# Patient Record
Sex: FEMALE | Race: WHITE | Employment: UNEMPLOYED | URBAN - METROPOLITAN AREA
[De-identification: names, ages, dates, MRNs, and addresses within clinical notes are randomized per-mention and may not be internally consistent; named-entity substitution may affect disease eponyms.]

---

## 2020-10-25 ENCOUNTER — HOSPITAL ENCOUNTER (EMERGENCY)
Age: 55
Discharge: HOME | End: 2020-10-25
Attending: EMERGENCY MEDICINE
Payer: COMMERCIAL

## 2020-10-25 ENCOUNTER — APPOINTMENT (OUTPATIENT)
Dept: GENERAL RADIOLOGY | Age: 55
End: 2020-10-25
Payer: COMMERCIAL

## 2020-10-25 VITALS
TEMPERATURE: 98.2 F | WEIGHT: 200 LBS | RESPIRATION RATE: 16 BRPM | SYSTOLIC BLOOD PRESSURE: 113 MMHG | HEART RATE: 93 BPM | DIASTOLIC BLOOD PRESSURE: 68 MMHG | OXYGEN SATURATION: 95 %

## 2020-10-25 LAB
A/G RATIO: 1.8 (ref 1.1–2.2)
ALBUMIN SERPL-MCNC: 4.2 G/DL (ref 3.4–5)
ALP BLD-CCNC: 73 U/L (ref 40–129)
ALT SERPL-CCNC: 16 U/L (ref 10–40)
ANION GAP SERPL CALCULATED.3IONS-SCNC: 10 MMOL/L (ref 3–16)
AST SERPL-CCNC: 21 U/L (ref 15–37)
BASOPHILS ABSOLUTE: 0.1 K/UL (ref 0–0.2)
BASOPHILS RELATIVE PERCENT: 0.7 %
BILIRUB SERPL-MCNC: 0.5 MG/DL (ref 0–1)
BUN BLDV-MCNC: 14 MG/DL (ref 7–20)
CALCIUM SERPL-MCNC: 8.7 MG/DL (ref 8.3–10.6)
CHLORIDE BLD-SCNC: 103 MMOL/L (ref 99–110)
CO2: 25 MMOL/L (ref 21–32)
CREAT SERPL-MCNC: 0.7 MG/DL (ref 0.6–1.1)
EOSINOPHILS ABSOLUTE: 0.1 K/UL (ref 0–0.6)
EOSINOPHILS RELATIVE PERCENT: 1 %
GFR AFRICAN AMERICAN: >60
GFR NON-AFRICAN AMERICAN: >60
GLOBULIN: 2.4 G/DL
GLUCOSE BLD-MCNC: 102 MG/DL (ref 70–99)
HCG QUALITATIVE: NEGATIVE
HCT VFR BLD CALC: 41.2 % (ref 36–48)
HEMOGLOBIN: 13.8 G/DL (ref 12–16)
LYMPHOCYTES ABSOLUTE: 2.8 K/UL (ref 1–5.1)
LYMPHOCYTES RELATIVE PERCENT: 27.5 %
MCH RBC QN AUTO: 30.5 PG (ref 26–34)
MCHC RBC AUTO-ENTMCNC: 33.5 G/DL (ref 31–36)
MCV RBC AUTO: 90.9 FL (ref 80–100)
MONOCYTES ABSOLUTE: 0.5 K/UL (ref 0–1.3)
MONOCYTES RELATIVE PERCENT: 5.2 %
NEUTROPHILS ABSOLUTE: 6.7 K/UL (ref 1.7–7.7)
NEUTROPHILS RELATIVE PERCENT: 65.6 %
PDW BLD-RTO: 14 % (ref 12.4–15.4)
PLATELET # BLD: 272 K/UL (ref 135–450)
PMV BLD AUTO: 7.6 FL (ref 5–10.5)
POTASSIUM SERPL-SCNC: 4.2 MMOL/L (ref 3.5–5.1)
RBC # BLD: 4.53 M/UL (ref 4–5.2)
REASON FOR REJECTION: NORMAL
REJECTED TEST: NORMAL
SEDIMENTATION RATE, ERYTHROCYTE: 9 MM/HR (ref 0–30)
SODIUM BLD-SCNC: 138 MMOL/L (ref 136–145)
TOTAL PROTEIN: 6.6 G/DL (ref 6.4–8.2)
TROPONIN: <0.01 NG/ML
TROPONIN: <0.01 NG/ML
WBC # BLD: 10.3 K/UL (ref 4–11)

## 2020-10-25 PROCEDURE — 2500000003 HC RX 250 WO HCPCS: Performed by: EMERGENCY MEDICINE

## 2020-10-25 PROCEDURE — 80053 COMPREHEN METABOLIC PANEL: CPT

## 2020-10-25 PROCEDURE — 99284 EMERGENCY DEPT VISIT MOD MDM: CPT

## 2020-10-25 PROCEDURE — 96374 THER/PROPH/DIAG INJ IV PUSH: CPT

## 2020-10-25 PROCEDURE — 96375 TX/PRO/DX INJ NEW DRUG ADDON: CPT

## 2020-10-25 PROCEDURE — 85025 COMPLETE CBC W/AUTO DIFF WBC: CPT

## 2020-10-25 PROCEDURE — 71046 X-RAY EXAM CHEST 2 VIEWS: CPT

## 2020-10-25 PROCEDURE — 84484 ASSAY OF TROPONIN QUANT: CPT

## 2020-10-25 PROCEDURE — 84703 CHORIONIC GONADOTROPIN ASSAY: CPT

## 2020-10-25 PROCEDURE — 85652 RBC SED RATE AUTOMATED: CPT

## 2020-10-25 PROCEDURE — 36415 COLL VENOUS BLD VENIPUNCTURE: CPT

## 2020-10-25 PROCEDURE — 6360000002 HC RX W HCPCS: Performed by: EMERGENCY MEDICINE

## 2020-10-25 RX ORDER — DEXAMETHASONE SODIUM PHOSPHATE 4 MG/ML
8 INJECTION, SOLUTION INTRA-ARTICULAR; INTRALESIONAL; INTRAMUSCULAR; INTRAVENOUS; SOFT TISSUE ONCE
Status: COMPLETED | OUTPATIENT
Start: 2020-10-25 | End: 2020-10-25

## 2020-10-25 RX ORDER — PREDNISONE 10 MG/1
40 TABLET ORAL DAILY
Qty: 12 TABLET | Refills: 0 | Status: SHIPPED | OUTPATIENT
Start: 2020-10-25 | End: 2020-10-25 | Stop reason: SDUPTHER

## 2020-10-25 RX ORDER — PREDNISONE 10 MG/1
40 TABLET ORAL DAILY
Qty: 12 TABLET | Refills: 0 | Status: SHIPPED | OUTPATIENT
Start: 2020-10-25 | End: 2020-10-28

## 2020-10-25 RX ORDER — DIPHENHYDRAMINE HYDROCHLORIDE 50 MG/ML
12.5 INJECTION INTRAMUSCULAR; INTRAVENOUS ONCE
Status: COMPLETED | OUTPATIENT
Start: 2020-10-25 | End: 2020-10-25

## 2020-10-25 RX ORDER — DEXAMETHASONE SODIUM PHOSPHATE 10 MG/ML
8 INJECTION INTRAMUSCULAR; INTRAVENOUS ONCE
Status: DISCONTINUED | OUTPATIENT
Start: 2020-10-25 | End: 2020-10-25 | Stop reason: CLARIF

## 2020-10-25 RX ADMIN — DEXAMETHASONE SODIUM PHOSPHATE 8 MG: 4 INJECTION, SOLUTION INTRA-ARTICULAR; INTRALESIONAL; INTRAMUSCULAR; INTRAVENOUS; SOFT TISSUE at 03:01

## 2020-10-25 RX ADMIN — FAMOTIDINE 20 MG: 10 INJECTION, SOLUTION INTRAVENOUS at 03:00

## 2020-10-25 RX ADMIN — DIPHENHYDRAMINE HYDROCHLORIDE 12.5 MG: 50 INJECTION, SOLUTION INTRAMUSCULAR; INTRAVENOUS at 03:00

## 2020-10-25 ASSESSMENT — ENCOUNTER SYMPTOMS
TROUBLE SWALLOWING: 0
DIARRHEA: 0
SORE THROAT: 0
SHORTNESS OF BREATH: 1
COUGH: 0
COLOR CHANGE: 1
NAUSEA: 1
FACIAL SWELLING: 0
VOMITING: 0
EYE DISCHARGE: 0
BACK PAIN: 0
EYE REDNESS: 0
CHEST TIGHTNESS: 1
PHOTOPHOBIA: 0
ABDOMINAL PAIN: 1
EYE PAIN: 0
WHEEZING: 0

## 2020-10-25 NOTE — ED PROVIDER NOTES
[address] [facility]  ED  EMERGENCY DEPARTMENT ENCOUNTER        Pt Name: Pankaj Negrete  MRN: [id number]  Armstrongfcherelle 1965  Date of evaluation: 10/25/2020  Provider: Char Plata MD  PCP: Jacinto Ibarra       Chief Complaint   Patient presents with    Allergic Reaction     woke up feeling SOB with generalized redness, took oral benadryl prior to EMS arrival - unsure of what reaction was to       HISTORY OFPRESENT ILLNESS   (Location/Symptom, Timing/Onset, Context/Setting, Quality, Duration, Modifying Factors,Severity)  Note limiting factors. Pankaj Negrete is a 63 y.o. female presenting today due to concern for waking up around 1:15 AM this morning which was roughly 1 hour prior to arrival and feeling like her skin was itching and burning on the arms and legs but also feeling very lightheaded and short of breath with some chest heaviness. She also felt nauseated and is having some stomach cramping although denies any diarrhea. She denies any fever or cough. No exposure to Covid. She has had allergic reactions before to antibiotics and this felt similar. She did take 1 Benadryl at home at 1:20 AM and this did help. She also became nauseous on the ride over so EMS gave her Zofran. She states that yesterday she was working in the yard but was covered from neck to toe and only her face and hands were exposed. She denies any hives. She denies any facial swelling or tongue or throat swelling. No syncope. No falls or trauma. She is still having some chest tightness and heaviness. She has no other concerns at this time. EMS brought her. She does states she had a recent stress test within the last 2 weeks that was reportedly normal (10/16/20). REVIEW OF SYSTEMS    (2-9 systems for level 4, 10 or more for level 5)     Review of Systems   Constitutional: Negative for chills, diaphoresis, fatigue and fever.    HENT: Negative for congestion, facial swelling, mouth sores, sore throat and trouble swallowing. Eyes: Negative for photophobia, pain, discharge, redness and visual disturbance. Respiratory: Positive for chest tightness and shortness of breath. Negative for cough and wheezing. Cardiovascular: Positive for chest pain. Gastrointestinal: Positive for abdominal pain (cramping throughout) and nausea. Negative for diarrhea and vomiting. Genitourinary: Negative for difficulty urinating, dysuria and flank pain. Musculoskeletal: Negative for back pain and neck pain. Skin: Positive for color change (redness to arms and legs per patient) and rash. Negative for pallor and wound. Neurological: Positive for light-headedness. Negative for syncope, weakness, numbness and headaches. Psychiatric/Behavioral: Negative for confusion. The patient is not nervous/anxious. Positives and Pertinent negatives as per HPI. PASTMEDICAL HISTORY   History reviewed. No pertinent past medical history. SURGICAL HISTORY     History reviewed. No pertinent surgical history. CURRENT MEDICATIONS       Previous Medications    No medications on file       ALLERGIES     Ciprofloxacin; Erythromycin; Levaquin [levofloxacin]; and Pcn [penicillins]    FAMILY HISTORY     History reviewed. No pertinent family history.        SOCIAL HISTORY       Social History     Socioeconomic History    Marital status:      Spouse name: None    Number of children: None    Years of education: None    Highest education level: None   Occupational History    None   Social Needs    Financial resource strain: None    Food insecurity     Worry: None     Inability: None    Transportation needs     Medical: None     Non-medical: None   Tobacco Use    Smoking status: Never Smoker    Smokeless tobacco: Never Used   Substance and Sexual Activity    Alcohol use: Yes     Comment: occasional    Drug use: None    Sexual activity: None   Lifestyle    Physical activity     Days per week: None     Minutes per session: None    Stress: None   Relationships    Social connections     Talks on phone: None     Gets together: None     Attends [employer] service: None     Active member of club or organization: None     Attends meetings of clubs or organizations: None     Relationship status: None    Intimate partner violence     Fear of current or ex partner: None     Emotionally abused: None     Physically abused: None     Forced sexual activity: None   Other Topics Concern    None   Social History Narrative    None       SCREENINGS                PHYSICAL EXAM    (up to 7 for level 4, 8 or more for level 5)     ED Triage Vitals [10/25/20 0229]   BP Temp Temp Source Pulse Resp SpO2 Height Weight   133/79 98.2 °F (36.8 °C) Oral 86 17 97 % -- --       Physical Exam  Vitals signs and nursing note reviewed. Constitutional:       General: She is awake. She is not in acute distress. Appearance: Normal appearance. She is well-developed and well-groomed. She is obese. She is not ill-appearing, toxic-appearing or diaphoretic. Interventions: She is not intubated. HENT:      Head: Normocephalic and atraumatic. No raccoon eyes, Silva's sign, abrasion, contusion, masses, right periorbital erythema, left periorbital erythema or laceration. Hair is normal.      Jaw: There is normal jaw occlusion. No trismus. Nose: Nose normal.      Mouth/Throat:      Lips: Pink. No lesions. Mouth: Mucous membranes are moist. No injury, lacerations, oral lesions or angioedema. Tongue: No lesions. Tongue does not deviate from midline. Palate: No mass and lesions. Pharynx: Oropharynx is clear. Uvula midline. No pharyngeal swelling, oropharyngeal exudate, posterior oropharyngeal erythema or uvula swelling. Eyes:      General:         Right eye: No discharge. Left eye: No discharge. Pupils: Pupils are equal, round, and reactive to light.    Neck:      Musculoskeletal: Full passive range of motion without pain, normal range of motion and neck supple. Normal range of motion. No edema, erythema, neck rigidity, crepitus, injury, pain with movement, torticollis, spinous process tenderness or muscular tenderness. Vascular: No JVD. Trachea: Trachea and phonation normal. No tracheal tenderness or tracheal deviation. Cardiovascular:      Rate and Rhythm: Normal rate and regular rhythm. Pulses: Normal pulses. Radial pulses are 2+ on the right side. Pulmonary:      Effort: Pulmonary effort is normal. No tachypnea, bradypnea, accessory muscle usage, prolonged expiration, respiratory distress or retractions. She is not intubated. Breath sounds: Normal breath sounds and air entry. No stridor, decreased air movement or transmitted upper airway sounds. No decreased breath sounds, wheezing, rhonchi or rales. Chest:      Chest wall: No tenderness. Abdominal:      General: Abdomen is flat. Bowel sounds are normal. There is no distension. Palpations: Abdomen is soft. Abdomen is not rigid. Tenderness: There is no abdominal tenderness. There is no guarding or rebound. Negative signs include Flannery's sign and McBurney's sign. Musculoskeletal: Normal range of motion. General: No swelling, tenderness, deformity or signs of injury. Right lower leg: No edema. Left lower leg: No edema. Skin:     General: Skin is warm and dry. Coloration: Skin is not ashen, cyanotic, jaundiced or pale. Findings: Erythema (bilateral arms, nontender to palpation) and rash present. No abrasion, abscess, bruising, ecchymosis, signs of injury, laceration, lesion or petechiae. Rash is macular (bilateral arms). Rash is not crusting, nodular, papular, purpuric, pustular, scaling, urticarial or vesicular. Neurological:      General: No focal deficit present. Mental Status: She is alert and oriented to person, place, and time. Mental status is at baseline.       GCS: GCS eye subscore is 4. GCS verbal subscore is 5. GCS motor subscore is 6. Cranial Nerves: No dysarthria or facial asymmetry. Sensory: Sensation is intact. No sensory deficit. Motor: Motor function is intact. No weakness, tremor, atrophy, abnormal muscle tone or seizure activity. Psychiatric:         Attention and Perception: Attention normal.         Mood and Affect: Affect normal. Mood is anxious. Speech: Speech normal.         Behavior: Behavior normal. Behavior is cooperative.              DIAGNOSTIC RESULTS   :    Labs Reviewed   COMPREHENSIVE METABOLIC PANEL - Abnormal; Notable for the following components:       Result Value    Glucose 102 (*)     All other components within normal limits    Narrative:     Cristel Moment tel. [phone],  Rejected Test NameESR/Called to: CHUCK Vernon, 10/25/2020 04:28, by Jana Blandon  Performed at:  [address],  [place], [address]   Phone [phone]   CBC WITH AUTO DIFFERENTIAL    Narrative:     Performed at:  [address],  [place], [address]   Phone [phone]   TROPONIN    Narrative:     Cristel Moment tel. [phone],  Rejected Test NameESR/Called to: CHUCK Vernon, 10/25/2020 04:28, by Jana Blandon  Performed at:  [address],  [place], [address]   Phone [phone]   HCG, SERUM, QUALITATIVE    Narrative:     Cristel [facility] tel. [phone],  Rejected Test NameESR/Called to: CHUCK Vernon, 10/25/2020 04:28, by Jana Blandon  Performed at:  [address],  [place], [address]   Phone [phone]    Narrative:     Cristel Moment tel. [phone],  Rejected Test NameESR/Called to: CHUCK Vernon, 10/25/2020 04:28, by Jana Blandon  Performed at:  [address],  [place], [place] Phone [phone]   SEDIMENTATION RATE    Narrative:     Performed at:  [facility]) - [facility]  [address] [place] [address],  [place], [address] [place] [address] [place]   Phone [phone]   TROPONIN       All other labs were within normal range or not returned asof this dictation. EKG: All EKG's are interpreted by the Emergency Department Physician who either signs or Co-signs this chart in the absence of a cardiologist.    The Ekg interpreted by me shows  normal sinus rhythm with a rate of 72  Axis is   Normal  QTc is  normal  Intervals and Durations are unremarkable. ST Segments: nonspecific changes  No significant change from prior EKG dated - no old EKG  No STEMI           RADIOLOGY:   Non-plain film images such as CT, Ultrasound and MRI are read by the radiologist. Cleophus Ahr images are visualized and preliminarily interpreted by the  ED Provider with the belowfindings:        Interpretation per the Radiologist below, if available at the time of this note:    XR CHEST (2 VW)   Final Result   No acute process.                PROCEDURES   Unless otherwise noted below, none     Procedures    CRITICAL CARE TIME   N/A    CONSULTS:  None    EMERGENCY DEPARTMENT COURSE and DIFFERENTIAL DIAGNOSIS/MDM:   Vitals:    Vitals:    10/25/20 0242 10/25/20 0428 10/25/20 0455 10/25/20 0528   BP:  130/78 125/64    Pulse:  80     Resp:  16     Temp:       TempSrc:       SpO2: 100% 100% 97% 91%   Weight: 200 lb (90.7 kg)          Patient was given the following medications:  Medications   famotidine (PEPCID) injection 20 mg (20 mg Intravenous Given 10/25/20 0300)   diphenhydrAMINE (BENADRYL) injection 12.5 mg (12.5 mg Intravenous Given 10/25/20 0300)   Dexamethasone Sodium Phosphate injection 8 mg (8 mg Intravenous Given 10/25/20 0301)     Patient was evaluated today due to concern for waking up around 1:15 AM associated with some burning and itching to her arms and legs along with some shortness of breath with lightheadedness and nausea along with abdominal cramping. No concern for angioedema or syncope. Initial blood pressure was normal.  After Benadryl, she was feeling better although did have some nausea on route to the ED. There is a chance this could be related to anaphylaxis although at this point she does appear well and only findings are subjective symptoms at this time other than her arms and legs do appear slightly erythematous bilaterally. At this time, I have low suspicion for cellulitis as a cause of the redness. No concern for sepsis at this time. She did complain of some chest tightness and shortness of breath and therefore we will do a cardiac work-up as well. Story not suggestive of pulmonary embolism. At this point, we will treat with Decadron along with Pepcid and Benadryl and then reevaluate to see how she is feeling. Again, I did offer epinephrine in case this is related to anaphylaxis but at this time the patient is declining. She has full mental capacity to make her own medical decisions. She was well-appearing and in no acute distress upon initial evaluation. No concern for any airway problems at this time. After being medicated, her symptoms resolved and the redness to her upper extremities resolved as well. She was feeling much better on repeat evaluation at 0605. We will repeat a troponin and as long as negative, I do believe she is safe for discharge. Story not suggestive of acute coronary syndrome and she did have a recent normal stress test.  She was signed out to Dr. Alexx Tsang at [id number]. Please see her note for final disposition. I have low suspicion for any other serious rash such as Watters-Vinh syndrome or pemphigus or DRESS syndrome as the cause of her symptoms today. The patient tolerated their visit well. The patient and / or the family were informed of the results of any tests, a time was given to answer questions. FINAL IMPRESSION      1.  Allergic reaction, initial encounter    2. Chest tightness    3. Shortness of breath    4. Nausea    5. Abdominal cramping    6. Lightheadedness    7.  Rash and other nonspecific skin eruption          DISPOSITION/PLAN   DISPOSITION  - pending repeat troponin       PATIENT REFERRED TO:  Kathy Mullins  ED  [address] [facility] [address] [facility]  Go to   If symptoms worsen    Rand Valle  [address] [place] [address]  [phone]    Call   As needed    [facility]  [address] [address] [address] [facility]  [phone]      Call your cardiologist for possible Holter Monitor      DISCHARGEMEDICATIONS:  New Prescriptions    PREDNISONE (DELTASONE) 10 MG TABLET    Take 4 tablets by mouth daily for 3 days Start on 10/26       DISCONTINUED MEDICATIONS:  Discontinued Medications    No medications on file              (Please note that portions of this note were completed with a voicerecognition program.  Efforts were made to edit the dictations but occasionally words are mis-transcribed.)    Sunny Wiseman MD (electronically signed)            Sunny Wiseman MD  10/25/20 6668